# Patient Record
Sex: FEMALE | Race: WHITE | Employment: UNEMPLOYED | URBAN - METROPOLITAN AREA
[De-identification: names, ages, dates, MRNs, and addresses within clinical notes are randomized per-mention and may not be internally consistent; named-entity substitution may affect disease eponyms.]

---
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
## 2025-01-15 NOTE — FLOWSHEET NOTE
Here is a list of  dental clinics that may be able to help you.  Keep in mind that these clinics do not have to see you or any other patient.  Also, these clinics are not connected to the [facility] or [facility] but if they agree to see you as a patient it is easy for them to call  Medical Records Department to have your records faxed to them.      [facility]:  [address]   [address]   [facility] [address]   [phone]     [facility]:   [address]   [address]   [place], [place] [id number] [phone]     [facility]:  [address] [place] [address] [phone]    [facility]:  [address] [place] [address] [phone]    [facility]:  [address],  [place] [address] [phone]    [facility]:  [address] [place] [address]  [phone]    [facility]:  [address] [place] [address]  [phone]    [facility]:  [address] [id number] [phone]    [facility]:  [address] [place], [place] [id number] [phone]    [facility]  [address], [place] [address]  [phone]    [facility]:  [address] [place] [id number] [phone]     [facility]:  [address] [place] [address] [phone]             Quad/Alexander neg neg                            ROM PROM AROM Overpressure Comment     L R L R L R     Flexion     150 150         Extension     WFL WFL               Strength L R Comment   Quad 4+ 4+     Hamstring 4+ 4+     Gastroc 4+ 4+     Hip flexion 4+ 4+     Hip abd 4 4     Hip add 4 4     Hip ext 4+ 4+           Special Test Results/Comment   Meniscal Click neg   Crepitus pos   Flexion Test neg   Valgus Laxity neg   Varus Laxity neg   Lachmans neg   Drop Back neg   Homans neg      Reflexes/Sensation:               [x]Dermatomes/Myotomes intact               [x]Reflexes equal and normal bilaterally              []Other:     Joint mobility:               [x]Normal               []Hypo              []Hyper     Palpation: No TTP     SL Squat: Full to bench with good form      SL Balance: 30\" ea LE    RESTRICTIONS/PRECAUTIONS:     Exercises/Interventions:   Exercise/Equipment Resistance/Repetitions Other comments   Stretching     Hamstring     Towel Pull     Inclined Calf     Hip Flexion     ITB     Groin     Quad 3x30\" prone        SLR     Supine 5#, x30    Abduction 5#, x30    Adduction 5#, x30    Prone     SLR+          Isometrics     Quad sets          Patellar Glides     Medial     Superior     Inferior          ROM     Sheet Pulls     Hang Weights     Passive     Active     Weight Shift     Ankle Pumps                    CKC     Calf raises     Wall sits Black, 3xfat    Step ups     1 leg stand 3x30\" ea LE Eyes closed   SL RDL 7#, x20 ea LE    SL TKEcc Silver, x30 ea LE    Balance     bridges          PRE     Extension  RANGE:   Flexion  RANGE:        Quantum machines     Leg press      Leg extension     Leg curl          Manual interventions                 Therapeutic Exercise and NMR EXR  [x] (97520) Provided verbal/tactile cueing for activities related to strengthening, flexibility, endurance, ROM for improvements in LE, proximal hip, and core control with self care, mobility, lifting, ambulation. [x] (20625) Provided verbal/tactile cueing for activities related to improving balance, coordination, kinesthetic sense, posture, motor skill, proprioception  to assist with LE, proximal hip, and core control in self care, mobility, lifting, ambulation and eccentric single leg control. NMR and Therapeutic Activities:    [x] (47020 or 70716) Provided verbal/tactile cueing for activities related to improving balance, coordination, kinesthetic sense, posture, motor skill, proprioception and motor activation to allow for proper function of core, proximal hip and LE with self care and ADLs  [] (73661) Gait Re-education- Provided training and instruction to the patient for proper LE, core and proximal hip recruitment and positioning and eccentric body weight control with ambulation re-education including up and down stairs     Home Exercise Program:    [x] (61779) Reviewed/Progressed HEP activities related to strengthening, flexibility, endurance, ROM of core, proximal hip and LE for functional self-care, mobility, lifting and ambulation/stair navigation   [] (52703)Reviewed/Progressed HEP activities related to improving balance, coordination, kinesthetic sense, posture, motor skill, proprioception of core, proximal hip and LE for self care, mobility, lifting, and ambulation/stair navigation      Manual Treatments:  PROM / STM / Oscillations-Mobs:  G-I, II, III, IV (PA's, Inf., Post.)  [] (73918) Provided manual therapy to mobilize LE, proximal hip and/or LS spine soft tissue/joints for the purpose of modulating pain, promoting relaxation,  increasing ROM, reducing/eliminating soft tissue swelling/inflammation/restriction, improving soft tissue extensibility and allowing for proper ROM for normal function with self care, mobility, lifting and ambulation.      Modalities:      Charges:  Timed Code Treatment Minutes: 45   Total Treatment Minutes: 60       [] EVAL (LOW) 00107 (typically 20 minutes face-to-face)  [] EVAL (MOD) 47192 (typically 30 minutes face-to-face)  [] EVAL (HIGH) 29998 (typically 45 minutes face-to-face)  [] RE-EVAL   [x] YQ(18187) x  2   [] IONTO  [x] NMR (04920) x  1   [] VASO  [] Manual (60151) x       [] Other:  [] TA x       [] Mech Traction (23596)  [] ES(attended) (67213)      [] ES (un) (16802):     GOALS:   Patient stated goal: return to full gymnastics without pain      Therapist goals for Patient:   Short Term Goals: To be achieved in: 2 weeks  1. Independent in HEP and progression per patient tolerance, in order to prevent re-injury. 2. Patient will have a decrease in pain to facilitate improvement in movement, function, and ADLs as indicated by Functional Deficits.     Long Term Goals: To be achieved in: 12 weeks  1. Disability index score of 10% or less for the LEFS to assist with reaching prior level of function. 2. Patient will demonstrate increased AROM to allow for proper joint functioning as indicated by patients Functional Deficits. 3. Patient will demonstrate an increase in Strength to good proximal hip strength and control in LE to allow for proper functional mobility as indicated by patients Functional Deficits. 4. Patient will return to functional activities without increased symptoms or restriction. 5. Patient will balance SL with eyes closed for < 20\" without LOB or increased symptoms or restriction. Progression Towards Functional goals:  [] Patient is progressing as expected towards functional goals listed. [] Progression is slowed due to complexities listed. [] Progression has been slowed due to co-morbidities. [x] Plan just implemented, too soon to assess goals progression  [] Other:     ASSESSMENT:  Pt tolerated treatment significantly well with PT able to progress all exercises with added strengthening and functional stability.   Pt's presents with normal AROM and strength, able to perform all functional movement patterns without reproduction of symptoms. PT instructed pt and father in return to full competition this weekend with use of J brace. If able to compete without reproduction of symptoms, pt will be discharged from care. Pt and father in agreement with plan. Treatment/Activity Tolerance:  [x] Patient tolerated treatment well [] Patient limited by fatique  [] Patient limited by pain  [] Patient limited by other medical complications  [] Other:     Patient education: Pt reviewed HEP and POC with pt; pt agreed with all questions answered. Pt to call PT with any questions    Prognosis: [x] Good [] Fair  [] Poor    Patient Requires Follow-up: [x] Yes  [] No    PLAN: See eval  [x] Continue per plan of care [] Alter current plan (see comments)  [] Plan of care initiated [] Hold pending MD visit [] Discharge    Electronically signed by: Olimpia Danielle PT, DPT      Tae Swain. Domo Gaitan DPT, OMT-C [id number]  Physical Therapist  [email or web address]